# Patient Record
Sex: FEMALE | Race: WHITE | NOT HISPANIC OR LATINO | URBAN - METROPOLITAN AREA
[De-identification: names, ages, dates, MRNs, and addresses within clinical notes are randomized per-mention and may not be internally consistent; named-entity substitution may affect disease eponyms.]

---

## 2017-05-21 ENCOUNTER — EMERGENCY (EMERGENCY)
Facility: HOSPITAL | Age: 32
LOS: 1 days | Discharge: HOME | End: 2017-05-21
Attending: EMERGENCY MEDICINE | Admitting: EMERGENCY MEDICINE
Payer: COMMERCIAL

## 2017-05-21 VITALS
HEART RATE: 88 BPM | OXYGEN SATURATION: 100 % | TEMPERATURE: 98 F | RESPIRATION RATE: 20 BRPM | DIASTOLIC BLOOD PRESSURE: 74 MMHG | SYSTOLIC BLOOD PRESSURE: 154 MMHG

## 2017-05-21 DIAGNOSIS — R10.30 LOWER ABDOMINAL PAIN, UNSPECIFIED: ICD-10-CM

## 2017-05-21 DIAGNOSIS — N83.299 OTHER OVARIAN CYST, UNSPECIFIED SIDE: ICD-10-CM

## 2017-05-21 LAB
APPEARANCE UR: CLEAR — SIGNIFICANT CHANGE UP
BASOPHILS NFR BLD AUTO: 0.4 % — SIGNIFICANT CHANGE UP (ref 0–2)
BILIRUB UR-MCNC: NEGATIVE — SIGNIFICANT CHANGE UP
COLOR SPEC: YELLOW — SIGNIFICANT CHANGE UP
DIFF PNL FLD: (no result)
EOSINOPHIL NFR BLD AUTO: 0.4 % — SIGNIFICANT CHANGE UP (ref 0–6)
GLUCOSE UR QL: NEGATIVE — SIGNIFICANT CHANGE UP
HCT VFR BLD CALC: 37.7 % — SIGNIFICANT CHANGE UP (ref 34.5–45)
HGB BLD-MCNC: 12.5 G/DL — SIGNIFICANT CHANGE UP (ref 11.5–15.5)
IMM GRANULOCYTES NFR BLD AUTO: 0.5 % — SIGNIFICANT CHANGE UP (ref 0–1.5)
KETONES UR-MCNC: NEGATIVE — SIGNIFICANT CHANGE UP
LEUKOCYTE ESTERASE UR-ACNC: NEGATIVE — SIGNIFICANT CHANGE UP
LYMPHOCYTES # BLD AUTO: 10.8 % — LOW (ref 13–44)
MCHC RBC-ENTMCNC: 27.8 PG — SIGNIFICANT CHANGE UP (ref 27–34)
MCHC RBC-ENTMCNC: 33.2 G/DL — SIGNIFICANT CHANGE UP (ref 32–36)
MCV RBC AUTO: 83.8 FL — SIGNIFICANT CHANGE UP (ref 80–100)
MONOCYTES NFR BLD AUTO: 5.8 % — SIGNIFICANT CHANGE UP (ref 2–14)
NEUTROPHILS NFR BLD AUTO: 82.1 % — HIGH (ref 43–77)
NITRITE UR-MCNC: NEGATIVE — SIGNIFICANT CHANGE UP
PH UR: 5.5 — SIGNIFICANT CHANGE UP (ref 5–8)
PLATELET # BLD AUTO: 212 K/UL — SIGNIFICANT CHANGE UP (ref 150–400)
PROT UR-MCNC: NEGATIVE MG/DL — SIGNIFICANT CHANGE UP
RBC # BLD: 4.5 M/UL — SIGNIFICANT CHANGE UP (ref 3.8–5.2)
RBC # FLD: 13.2 % — SIGNIFICANT CHANGE UP (ref 10.3–16.9)
SP GR SPEC: >=1.03 — SIGNIFICANT CHANGE UP (ref 1–1.03)
UROBILINOGEN FLD QL: 0.2 E.U./DL — SIGNIFICANT CHANGE UP
WBC # BLD: 10.4 K/UL — SIGNIFICANT CHANGE UP (ref 3.8–10.5)
WBC # FLD AUTO: 10.4 K/UL — SIGNIFICANT CHANGE UP (ref 3.8–10.5)

## 2017-05-21 PROCEDURE — 99285 EMERGENCY DEPT VISIT HI MDM: CPT | Mod: 25

## 2017-05-21 RX ORDER — SODIUM CHLORIDE 9 MG/ML
1000 INJECTION INTRAMUSCULAR; INTRAVENOUS; SUBCUTANEOUS ONCE
Qty: 0 | Refills: 0 | Status: COMPLETED | OUTPATIENT
Start: 2017-05-21 | End: 2017-05-21

## 2017-05-21 RX ORDER — ONDANSETRON 8 MG/1
4 TABLET, FILM COATED ORAL ONCE
Qty: 0 | Refills: 0 | Status: COMPLETED | OUTPATIENT
Start: 2017-05-21 | End: 2017-05-21

## 2017-05-21 RX ORDER — IOHEXOL 300 MG/ML
50 INJECTION, SOLUTION INTRAVENOUS ONCE
Qty: 0 | Refills: 0 | Status: COMPLETED | OUTPATIENT
Start: 2017-05-21 | End: 2017-05-22

## 2017-05-21 RX ADMIN — ONDANSETRON 4 MILLIGRAM(S): 8 TABLET, FILM COATED ORAL at 23:50

## 2017-05-21 NOTE — ED ADULT TRIAGE NOTE - CHIEF COMPLAINT QUOTE
Received patient as walkin with complaints of RLQ pain for two hours. Denies any n/v/d. reports pain began 2 hours after eating dinner.

## 2017-05-22 ENCOUNTER — EMERGENCY (EMERGENCY)
Facility: HOSPITAL | Age: 32
LOS: 1 days | Discharge: HOME | End: 2017-05-22
Attending: EMERGENCY MEDICINE | Admitting: EMERGENCY MEDICINE
Payer: COMMERCIAL

## 2017-05-22 VITALS
OXYGEN SATURATION: 99 % | HEART RATE: 84 BPM | DIASTOLIC BLOOD PRESSURE: 67 MMHG | TEMPERATURE: 98 F | SYSTOLIC BLOOD PRESSURE: 114 MMHG | RESPIRATION RATE: 18 BRPM

## 2017-05-22 VITALS
TEMPERATURE: 99 F | RESPIRATION RATE: 16 BRPM | DIASTOLIC BLOOD PRESSURE: 62 MMHG | OXYGEN SATURATION: 99 % | SYSTOLIC BLOOD PRESSURE: 112 MMHG | HEART RATE: 65 BPM

## 2017-05-22 VITALS
HEART RATE: 83 BPM | OXYGEN SATURATION: 99 % | RESPIRATION RATE: 19 BRPM | DIASTOLIC BLOOD PRESSURE: 56 MMHG | SYSTOLIC BLOOD PRESSURE: 108 MMHG

## 2017-05-22 DIAGNOSIS — R10.31 RIGHT LOWER QUADRANT PAIN: ICD-10-CM

## 2017-05-22 DIAGNOSIS — N83.202 UNSPECIFIED OVARIAN CYST, LEFT SIDE: ICD-10-CM

## 2017-05-22 LAB
ALBUMIN SERPL ELPH-MCNC: 4.3 G/DL — SIGNIFICANT CHANGE UP (ref 3.4–5)
ALP SERPL-CCNC: 61 U/L — SIGNIFICANT CHANGE UP (ref 40–120)
ALT FLD-CCNC: 18 U/L — SIGNIFICANT CHANGE UP (ref 12–42)
ANION GAP SERPL CALC-SCNC: 9 MMOL/L — SIGNIFICANT CHANGE UP (ref 9–16)
AST SERPL-CCNC: 20 U/L — SIGNIFICANT CHANGE UP (ref 15–37)
BACTERIA # UR AUTO: PRESENT /HPF
BILIRUB SERPL-MCNC: 0.4 MG/DL — SIGNIFICANT CHANGE UP (ref 0.2–1.2)
BLD GP AB SCN SERPL QL: NEGATIVE — SIGNIFICANT CHANGE UP
BUN SERPL-MCNC: 10 MG/DL — SIGNIFICANT CHANGE UP (ref 7–23)
CALCIUM SERPL-MCNC: 9.3 MG/DL — SIGNIFICANT CHANGE UP (ref 8.5–10.5)
CHLORIDE SERPL-SCNC: 106 MMOL/L — SIGNIFICANT CHANGE UP (ref 96–108)
CO2 SERPL-SCNC: 28 MMOL/L — SIGNIFICANT CHANGE UP (ref 22–31)
COMMENT - URINE: SIGNIFICANT CHANGE UP
CREAT SERPL-MCNC: 0.8 MG/DL — SIGNIFICANT CHANGE UP (ref 0.5–1.3)
EPI CELLS # UR: SIGNIFICANT CHANGE UP /HPF
GLUCOSE SERPL-MCNC: 129 MG/DL — HIGH (ref 70–99)
HCG UR QL: NEGATIVE — SIGNIFICANT CHANGE UP
HCT VFR BLD CALC: 36.1 % — SIGNIFICANT CHANGE UP (ref 34.5–45)
HGB BLD-MCNC: 12.2 G/DL — SIGNIFICANT CHANGE UP (ref 11.5–15.5)
MCHC RBC-ENTMCNC: 28.2 PG — SIGNIFICANT CHANGE UP (ref 27–34)
MCHC RBC-ENTMCNC: 33.8 G/DL — SIGNIFICANT CHANGE UP (ref 32–36)
MCV RBC AUTO: 83.6 FL — SIGNIFICANT CHANGE UP (ref 80–100)
PLATELET # BLD AUTO: 210 K/UL — SIGNIFICANT CHANGE UP (ref 150–400)
POTASSIUM SERPL-MCNC: 3.3 MMOL/L — LOW (ref 3.5–5.3)
POTASSIUM SERPL-SCNC: 3.3 MMOL/L — LOW (ref 3.5–5.3)
PROT SERPL-MCNC: 7.7 G/DL — SIGNIFICANT CHANGE UP (ref 6.4–8.2)
RBC # BLD: 4.32 M/UL — SIGNIFICANT CHANGE UP (ref 3.8–5.2)
RBC # FLD: 13.6 % — SIGNIFICANT CHANGE UP (ref 10.3–16.9)
RBC CASTS # UR COMP ASSIST: < 5 /HPF — SIGNIFICANT CHANGE UP
RH IG SCN BLD-IMP: POSITIVE — SIGNIFICANT CHANGE UP
RH IG SCN BLD-IMP: POSITIVE — SIGNIFICANT CHANGE UP
SODIUM SERPL-SCNC: 143 MMOL/L — SIGNIFICANT CHANGE UP (ref 132–145)
WBC # BLD: 11.7 K/UL — HIGH (ref 3.8–10.5)
WBC # FLD AUTO: 11.7 K/UL — HIGH (ref 3.8–10.5)
WBC UR QL: < 5 /HPF — SIGNIFICANT CHANGE UP

## 2017-05-22 PROCEDURE — 86850 RBC ANTIBODY SCREEN: CPT

## 2017-05-22 PROCEDURE — 85027 COMPLETE CBC AUTOMATED: CPT

## 2017-05-22 PROCEDURE — 36415 COLL VENOUS BLD VENIPUNCTURE: CPT

## 2017-05-22 PROCEDURE — 99285 EMERGENCY DEPT VISIT HI MDM: CPT | Mod: 25

## 2017-05-22 PROCEDURE — 86901 BLOOD TYPING SEROLOGIC RH(D): CPT

## 2017-05-22 PROCEDURE — 74177 CT ABD & PELVIS W/CONTRAST: CPT | Mod: 26

## 2017-05-22 PROCEDURE — 99284 EMERGENCY DEPT VISIT MOD MDM: CPT | Mod: 25

## 2017-05-22 PROCEDURE — 76857 US EXAM PELVIC LIMITED: CPT | Mod: 26

## 2017-05-22 PROCEDURE — 76830 TRANSVAGINAL US NON-OB: CPT | Mod: 26

## 2017-05-22 PROCEDURE — 86900 BLOOD TYPING SEROLOGIC ABO: CPT

## 2017-05-22 PROCEDURE — 76857 US EXAM PELVIC LIMITED: CPT

## 2017-05-22 RX ORDER — KETOROLAC TROMETHAMINE 30 MG/ML
30 SYRINGE (ML) INJECTION ONCE
Qty: 0 | Refills: 0 | Status: DISCONTINUED | OUTPATIENT
Start: 2017-05-22 | End: 2017-05-22

## 2017-05-22 RX ORDER — MORPHINE SULFATE 50 MG/1
4 CAPSULE, EXTENDED RELEASE ORAL ONCE
Qty: 0 | Refills: 0 | Status: DISCONTINUED | OUTPATIENT
Start: 2017-05-22 | End: 2017-05-22

## 2017-05-22 RX ADMIN — MORPHINE SULFATE 4 MILLIGRAM(S): 50 CAPSULE, EXTENDED RELEASE ORAL at 01:57

## 2017-05-22 RX ADMIN — IOHEXOL 50 MILLILITER(S): 300 INJECTION, SOLUTION INTRAVENOUS at 00:05

## 2017-05-22 RX ADMIN — Medication 30 MILLIGRAM(S): at 05:14

## 2017-05-22 RX ADMIN — SODIUM CHLORIDE 1000 MILLILITER(S): 9 INJECTION INTRAMUSCULAR; INTRAVENOUS; SUBCUTANEOUS at 00:05

## 2017-05-22 NOTE — CONSULT NOTE ADULT - ASSESSMENT
30 yo G0 LMP 5/15 presents on referral from [facility] for evaluation of abdominal pain. Pt's vital signs stable. Pt's hemoglobin at 23:47 5/21 was 12.5, and repeat hemoglobin at 6:50 5/22 is 12.2. Pt states her pain has decreased and did not require any additional pain medication at [facility]. Pt is currently menstruating. TVUS reveals enlarged left ovary 6.5cm secondary to a complex cystic mass 7.5cm with homogenous low level echoes, avascular lesion. Doppler reveals flow to both ovaries, no evidence of torsion, and physiologic free fluid in cul-de-sac. No acute intervention necessary at this time. Pt advised to follow up with a GYN physician within the next week. 1. No acute GYN intervention at this time. Pt advised to go to nearest ED if fever >100.4, severe abdominal pain, or heavy vaginal bleeding. Pt discussed with Dr. Deal.

## 2017-05-22 NOTE — ED PROVIDER NOTE - OBJECTIVE STATEMENT
31 y.o. female with c/o severe lower abdominal/pelvic pain, hx heavy menses for several months, Denies fever, chills, N/V/D/C, melena, hematochezia, hematuria, change in urinary/bowel function, flank pain, HA, dizziness, focal weakness, CP, SOB, palpitations, cough, and malaise.

## 2017-05-22 NOTE — ED PROVIDER NOTE - CARE PLAN
Principal Discharge DX:	Abdominal pain in female Principal Discharge DX:	Cyst of left ovary  Secondary Diagnosis:	Hemorrhagic ovarian cyst

## 2017-05-22 NOTE — ED ADULT NURSE REASSESSMENT NOTE - NS ED NURSE REASSESS COMMENT FT1
received patient awake & comfortably lying on bed, communicative. abdomen is soft and lax with pain but tolerable during palpation. ANNE revealed no torsion. waiting for ob-gyn consult. will continue to monitor.

## 2017-05-22 NOTE — ED POST DISCHARGE NOTE - ADDITIONAL DOCUMENTATION
spoke to pt - will f/u w/her gyn for eval, follow up and repeat study, understands to return sooner for worsening symptoms

## 2017-05-22 NOTE — ED PROVIDER NOTE - PROGRESS NOTE DETAILS
bedside US with (+) large LOV cyst ?decreased blood flow to LOV, spoke with GYN [facility] dr haley[facility], accepted for transfer to ER by DR SAUL

## 2017-05-22 NOTE — CONSULT NOTE ADULT - SUBJECTIVE AND OBJECTIVE BOX
30 yo G0 LMP 5/15 presents on referral from [facility] for evaluation of abdominal pain, r/o ruptured ovarian cyst vs. ovarian torsion. She reports sudden onset of right-sided abdominal cramping pain shortly after intercourse followed by 3 episodes of nausea w/ vomiting. She also reports occasional chills/shaking spells. She went ot [facility] for evaluation and received morphine and toradol which provided relief. She denies HA, dizziness, CP, palpitations, SOB. She states she is on the last day of her period and had only spotting yesterday.  OBHx: denies  GYNHx: currently does not have a GYN, denies hx of ovarian cysts, abnormal pap smears, uterine fibroids, STIs, states her menses are regular lasting 5-7 days and heavy every other month  MedHx: denies  SurgHx: denies  NKDA    Vital Signs Last 24 Hrs  T(C): 36.9, Max: 36.9 (05-22 @ 05:42)  T(F): 98.4, Max: 98.4 (05-22 @ 05:42)  HR: 84 (84 - 84)  BP: 114/67 (114/67 - 114/67)  BP(mean): --  RR: 18 (18 - 18)  SpO2: 99% (99% - 99%)  GA: NAD, A+OX3  CV: RRR, no MRG  Pulm: CTAB  Abd: +BS, soft, nontender on palpation of all four quadrants, nondistended, no rebound or guarding  Pelvic: 30 yo G0 LMP 5/15 presents on referral from [facility] for evaluation of abdominal pain, r/o ruptured ovarian cyst vs. ovarian torsion. She reports sudden onset of right-sided abdominal cramping pain shortly after intercourse followed by 3 episodes of nausea w/ vomiting. She also reports occasional chills/shaking spells. She went ot [facility] for evaluation and received morphine and toradol which provided relief. She denies HA, dizziness, CP, palpitations, SOB. She states she is on the last day of her period and had only spotting yesterday.  OBHx: denies  GYNHx: currently does not have a GYN, denies hx of ovarian cysts, abnormal pap smears, uterine fibroids, STIs, states her menses are regular lasting 5-7 days and heavy every other month  MedHx: denies  SurgHx: denies  NKDA    Vital Signs Last 24 Hrs  T(C): 36.9, Max: 36.9 (05-22 @ 05:42)  T(F): 98.4, Max: 98.4 (05-22 @ 05:42)  HR: 84 (84 - 84)  BP: 114/67 (114/67 - 114/67)  BP(mean): --  RR: 18 (18 - 18)  SpO2: 99% (99% - 99%)      LABS:                        12.2   11.7  )-----------( 210      ( 22 May 2017 06:50 )             36.1     05-21    143  |  106  |  10  ----------------------------<  129<H>  3.3<L>   |  28  |  0.80    Ca    9.3      21 May 2017 23:47    TPro  7.7  /  Alb  4.3  /  TBili  0.4  /  DBili  x   /  AST  20  /  ALT  18  /  AlkPhos  61  05-21      Urinalysis Basic - ( 21 May 2017 23:52 )    Color: Yellow / Appearance: Clear / SG: >=1.030 / pH: x  Gluc: x / Ketone: NEGATIVE  / Bili: NEGATIVE / Urobili: 0.2 E.U./dL   Blood: x / Protein: NEGATIVE mg/dL / Nitrite: NEGATIVE   Leuk Esterase: NEGATIVE / RBC: < 5 /HPF / WBC < 5 /HPF   Sq Epi: x / Non Sq Epi: Rare /HPF / Bacteria: Present /HPF    GA: NAD, A+OX3  CV: RRR, no MRG  Pulm: CTAB  Abd: +BS, soft, nontender on palpation of all four quadrants, nondistended, no rebound or guarding    RADIOLOGY & ADDITIONAL STUDIES:  US Pelvis 5/22:     NTERPRETATION:  PELVIC ULTRASOUND dated 5/22/2017 7:00 AM    INDICATION: Right lower quadrant pain. Rule out torsion. Currently   menstruating.    TECHNIQUE: Transabdominal views of the pelvis were obtained followed by   transvaginal views for better visualization of the ovaries.      PRIOR STUDIES: Abdomen and pelvis CT from earlier the same day    FINDINGS:   These images demonstrate theuterus to be anteverted. The uterus is   normal in size and shape.  The uterus is 7.4 x 3.3 x 4.2 cm.  No   myometrial abnormalities are seen.      The right ovary is normal in size, measuring 2.2 x 1.5 x 2.8 cm. No right   ovarian masses are seen. The left ovary is enlarged measuring 6.5 x 5.8 x   2.1 cm. The left ovary is enlarged secondary to a complex cystic mass   measuring 5.6 x 1.5 x 7.5 cm and demonstrates homogeneous low level   internal echoes. The lesion is avascular. This likely represents an   endometrioma versus hemorrhagic cyst. Doppler evaluation demonstrates   flow to both ovaries with no evidence of torsion.    A physiologic amount of free fluid is seen in the cul-de-sac.    1.  No ultrasound evidence of ovarian torsion.  2.  Complex cystic left ovarian mass measuring 7.5 cm which likely   represents an endometrioma versus hemorrhagic cyst. Recommend follow-up   pelvic ultrasound in 6-12 weeks for further evaluation.    A message concerning the recommendation was left on the callback line of   the emergency department at the time of this dictation.

## 2017-05-22 NOTE — ED PROVIDER NOTE - MEDICAL DECISION MAKING DETAILS
lower abd pain, hemoperitoneum, cyst on US - possible ruptured hemorrhagic cyst  -recheck CBC, gyn consulted, US

## 2017-05-22 NOTE — ED PROVIDER NOTE - PROGRESS NOTE DETAILS
s/o from Dr. Valente. GYN PA saw pt and d/w attg - pt pain free. OK for DC with gyn f/u - pt advised possible need for nonemergent MRi and surgical removal of large cyst in the future, return precautions given.

## 2017-05-22 NOTE — ED PROVIDER NOTE - CHIEF COMPLAINT
The patient is a 31y Female complaining of abdominal pain. Pulses equal bilaterally, no edema present.

## 2017-05-22 NOTE — ED ADULT NURSE NOTE - OBJECTIVE STATEMENT
31 yr old female was transferred from [facility] to r/o ovarian torsion. c.o pain to right lower quadrant. denies any nausea, vomiting, diarrhea at this time. no distress noted. hep lock noted to right ac#[id number], with no observable abnormalities noted. waiting to be seen by md

## 2017-05-22 NOTE — ED PROVIDER NOTE - MEDICAL DECISION MAKING DETAILS
ovarian cyst, concern for torsion, will transfer to [facility] ER for further evaluation and US

## 2017-05-22 NOTE — ED PROVIDER NOTE - OBJECTIVE STATEMENT
31F c/o sudden severe RLQ abd pain. pt evaluated at [facility] - no appendicitis on CT.  bedside US concerning for large ovarian cyst. pt with hemoperitoneum on CT. transferred here for GYN consultation and US. no n/v/d. no fevers. given morphine and toradol at [facility].

## 2021-04-30 NOTE — ED PROVIDER NOTE - NS ED MD SHIFT CHANGE PENDING ITEMS
04/30/21        Rakesh Thurman  [address] [facility]  [place] [address]        Dear Rakesh Thurman    We are pleased to report normal/stable results from the following studies performed during your last visit. Your vitamin-D level is normal but at the low end of normal range. You should take vitamin D3 2000 units daily. We will repeat the labs in one year.      Resulted Orders   GLUCOSE LEVEL   Result Value Ref Range    Fasting Status 12 Hours    Glucose 87 65 - 99 mg/dL   LIPID PANEL WITH REFLEX   Result Value Ref Range    Fasting Status 12 Hours    Cholesterol 140 <=169 mg/dL    Triglycerides 38 <=89 mg/dL    HDL 56 >=46 mg/dL    LDL 76 <=109 mg/dL    Non-HDL Cholesterol 84 <=119 mg/dL    Cholesterol/ HDL Ratio 2.5 <=4.4   THYROID STIMULATING HORMONE REFLEX   Result Value Ref Range    TSH 1.745 0.463 - 4.130 mcUnits/mL   VITAMIN D -25 HYDROXY   Result Value Ref Range    Vitamin D, 25-Hydroxy 32.0 30.0 - 100.0 ng/mL         [] Continue on your current medications          If you have any questions about your test results please call our office Monday through Friday from 8:00 am. to 5:00 pm.    Sincerely,    Kaveh Lloyd MD  [address] [place] [address] [address] [place] [address] [phone]                                         ultrasound results/physician/consult arrival

## 2022-10-14 NOTE — ED PROVIDER NOTE - NS ED ATTENDING STATEMENT MOD
Advised patient about the below message.  Patient does not want the MRI.  Advised patient if she changes her mind, she can call and we can order it.      Advised patient about her bladder ultrasound.  Urinalysis ordered and appointment was scheduled.   Attending Only